# Patient Record
Sex: FEMALE | Race: WHITE
[De-identification: names, ages, dates, MRNs, and addresses within clinical notes are randomized per-mention and may not be internally consistent; named-entity substitution may affect disease eponyms.]

---

## 2018-08-11 ENCOUNTER — HOSPITAL ENCOUNTER (EMERGENCY)
Dept: HOSPITAL 95 - ER | Age: 58
Discharge: HOME | End: 2018-08-11
Payer: COMMERCIAL

## 2018-08-11 VITALS — HEIGHT: 62 IN | WEIGHT: 180.01 LBS | BODY MASS INDEX: 33.13 KG/M2

## 2018-08-11 DIAGNOSIS — M65.311: Primary | ICD-10-CM

## 2018-08-11 DIAGNOSIS — Z88.1: ICD-10-CM

## 2018-08-11 DIAGNOSIS — Z79.899: ICD-10-CM

## 2018-08-11 DIAGNOSIS — F17.210: ICD-10-CM

## 2023-08-16 ENCOUNTER — HOSPITAL ENCOUNTER (OUTPATIENT)
Dept: HOSPITAL 95 - LAB | Age: 63
End: 2023-08-16
Attending: FAMILY MEDICINE
Payer: COMMERCIAL

## 2023-08-16 DIAGNOSIS — E11.69: Primary | ICD-10-CM

## 2023-08-16 LAB
CREAT UR-MCNC: 74 MG/DL (ref 27–270)
MICROALBUMIN UR-MCNC: 5.55 MG/L (ref 0–20)
MICROALBUMIN/CREAT UR: 7.5 MG/G (ref 0–30)

## 2024-09-21 ENCOUNTER — HOSPITAL ENCOUNTER (INPATIENT)
Dept: HOSPITAL 95 - ER | Age: 64
LOS: 3 days | Discharge: HOME | DRG: 178 | End: 2024-09-24
Attending: HOSPITALIST | Admitting: HOSPITALIST
Payer: COMMERCIAL

## 2024-09-21 VITALS — DIASTOLIC BLOOD PRESSURE: 56 MMHG | SYSTOLIC BLOOD PRESSURE: 112 MMHG

## 2024-09-21 VITALS — HEIGHT: 64 IN | WEIGHT: 179.68 LBS | BODY MASS INDEX: 30.67 KG/M2

## 2024-09-21 VITALS — DIASTOLIC BLOOD PRESSURE: 61 MMHG | SYSTOLIC BLOOD PRESSURE: 111 MMHG

## 2024-09-21 VITALS — DIASTOLIC BLOOD PRESSURE: 58 MMHG | SYSTOLIC BLOOD PRESSURE: 124 MMHG

## 2024-09-21 DIAGNOSIS — I10: ICD-10-CM

## 2024-09-21 DIAGNOSIS — E78.5: ICD-10-CM

## 2024-09-21 DIAGNOSIS — J15.69: Primary | ICD-10-CM

## 2024-09-21 DIAGNOSIS — Z79.51: ICD-10-CM

## 2024-09-21 DIAGNOSIS — J44.0: ICD-10-CM

## 2024-09-21 DIAGNOSIS — Z90.710: ICD-10-CM

## 2024-09-21 DIAGNOSIS — Z79.899: ICD-10-CM

## 2024-09-21 DIAGNOSIS — Z98.890: ICD-10-CM

## 2024-09-21 DIAGNOSIS — F17.210: ICD-10-CM

## 2024-09-21 LAB
ALBUMIN SERPL BCP-MCNC: 1.8 G/DL (ref 3.4–5)
ALBUMIN/GLOB SERPL: 0.3 {RATIO} (ref 0.8–1.8)
ALT SERPL W P-5'-P-CCNC: 71 U/L (ref 12–78)
ANION GAP SERPL CALCULATED.4IONS-SCNC: 12 MMOL/L (ref 3–11)
AST SERPL W P-5'-P-CCNC: 99 U/L (ref 12–37)
BASOPHILS # BLD AUTO: 0.06 K/MM3 (ref 0–0.23)
BASOPHILS NFR BLD AUTO: 0 % (ref 0–2)
BILIRUB SERPL-MCNC: 0.4 MG/DL (ref 0.1–1)
BUN SERPL-MCNC: 15 MG/DL (ref 8–24)
CALCIUM SERPL-MCNC: 9.2 MG/DL (ref 8.5–10.1)
CHLORIDE SERPL-SCNC: 102 MMOL/L (ref 98–108)
CO2 SERPL-SCNC: 23 MMOL/L (ref 21–32)
CREAT SERPL-MCNC: 0.62 MG/DL (ref 0.4–1)
DEPRECATED RDW RBC AUTO: 48.9 FL (ref 35.1–46.3)
EOSINOPHIL # BLD AUTO: 0.16 K/MM3 (ref 0–0.68)
EOSINOPHIL NFR BLD AUTO: 1 % (ref 0–6)
ERYTHROCYTE [DISTWIDTH] IN BLOOD BY AUTOMATED COUNT: 15.3 % (ref 11.7–14.2)
GLOBULIN SER CALC-MCNC: 5.3 G/DL (ref 2.2–4)
GLUCOSE SERPL-MCNC: 139 MG/DL (ref 70–99)
HCT VFR BLD AUTO: 32.4 % (ref 33–51)
HGB BLD-MCNC: 11.1 G/DL (ref 11.5–16)
IMM GRANULOCYTES # BLD AUTO: 0.13 K/MM3 (ref 0–0.1)
IMM GRANULOCYTES NFR BLD AUTO: 1 % (ref 0–1)
LYMPHOCYTES # BLD AUTO: 1.81 K/MM3 (ref 0.84–5.2)
LYMPHOCYTES NFR BLD AUTO: 13 % (ref 21–46)
MCHC RBC AUTO-ENTMCNC: 34.3 G/DL (ref 31.5–36.5)
MCV RBC AUTO: 87 FL (ref 80–100)
MONOCYTES # BLD AUTO: 0.62 K/MM3 (ref 0.16–1.47)
MONOCYTES NFR BLD AUTO: 4 % (ref 4–13)
NEUTROPHILS # BLD AUTO: 11.56 K/MM3 (ref 1.96–9.15)
NEUTROPHILS NFR BLD AUTO: 81 % (ref 41–73)
NRBC # BLD AUTO: 0 K/MM3 (ref 0–0.02)
NRBC BLD AUTO-RTO: 0 /100 WBC (ref 0–0.2)
PLATELET # BLD AUTO: 763 K/MM3 (ref 150–400)
POTASSIUM SERPL-SCNC: 4.1 MMOL/L (ref 3.5–5.5)
PROT SERPL-MCNC: 7.1 G/DL (ref 6.4–8.2)
SODIUM SERPL-SCNC: 133 MMOL/L (ref 136–145)

## 2024-09-21 PROCEDURE — A9270 NON-COVERED ITEM OR SERVICE: HCPCS

## 2024-09-21 PROCEDURE — G0378 HOSPITAL OBSERVATION PER HR: HCPCS

## 2024-09-22 VITALS — SYSTOLIC BLOOD PRESSURE: 108 MMHG | DIASTOLIC BLOOD PRESSURE: 69 MMHG

## 2024-09-22 VITALS — DIASTOLIC BLOOD PRESSURE: 80 MMHG | SYSTOLIC BLOOD PRESSURE: 140 MMHG

## 2024-09-22 VITALS — SYSTOLIC BLOOD PRESSURE: 144 MMHG | DIASTOLIC BLOOD PRESSURE: 73 MMHG

## 2024-09-22 VITALS — SYSTOLIC BLOOD PRESSURE: 120 MMHG | DIASTOLIC BLOOD PRESSURE: 68 MMHG

## 2024-09-22 LAB
ALBUMIN SERPL BCP-MCNC: 1.6 G/DL (ref 3.4–5)
ALBUMIN/GLOB SERPL: 0.3 {RATIO} (ref 0.8–1.8)
ALT SERPL W P-5'-P-CCNC: 67 U/L (ref 12–78)
ANION GAP SERPL CALCULATED.4IONS-SCNC: 11 MMOL/L (ref 3–11)
AST SERPL W P-5'-P-CCNC: 75 U/L (ref 12–37)
BASOPHILS # BLD AUTO: 0.08 K/MM3 (ref 0–0.23)
BASOPHILS NFR BLD AUTO: 1 % (ref 0–2)
BILIRUB SERPL-MCNC: 0.3 MG/DL (ref 0.1–1)
BUN SERPL-MCNC: 13 MG/DL (ref 8–24)
CALCIUM SERPL-MCNC: 8.5 MG/DL (ref 8.5–10.1)
CHLORIDE SERPL-SCNC: 108 MMOL/L (ref 98–108)
CO2 SERPL-SCNC: 24 MMOL/L (ref 21–32)
CREAT SERPL-MCNC: 0.77 MG/DL (ref 0.4–1)
DEPRECATED RDW RBC AUTO: 50.2 FL (ref 35.1–46.3)
EOSINOPHIL # BLD AUTO: 0.13 K/MM3 (ref 0–0.68)
EOSINOPHIL NFR BLD AUTO: 1 % (ref 0–6)
ERYTHROCYTE [DISTWIDTH] IN BLOOD BY AUTOMATED COUNT: 15.4 % (ref 11.7–14.2)
GLOBULIN SER CALC-MCNC: 4.7 G/DL (ref 2.2–4)
GLUCOSE SERPL-MCNC: 117 MG/DL (ref 70–99)
HCT VFR BLD AUTO: 30.7 % (ref 33–51)
HGB BLD-MCNC: 10.1 G/DL (ref 11.5–16)
IMM GRANULOCYTES # BLD AUTO: 0.07 K/MM3 (ref 0–0.1)
IMM GRANULOCYTES NFR BLD AUTO: 1 % (ref 0–1)
LYMPHOCYTES # BLD AUTO: 1.89 K/MM3 (ref 0.84–5.2)
LYMPHOCYTES NFR BLD AUTO: 19 % (ref 21–46)
MCHC RBC AUTO-ENTMCNC: 32.9 G/DL (ref 31.5–36.5)
MCV RBC AUTO: 89 FL (ref 80–100)
MONOCYTES # BLD AUTO: 0.54 K/MM3 (ref 0.16–1.47)
MONOCYTES NFR BLD AUTO: 5 % (ref 4–13)
NEUTROPHILS # BLD AUTO: 7.46 K/MM3 (ref 1.96–9.15)
NEUTROPHILS NFR BLD AUTO: 73 % (ref 41–73)
NRBC # BLD AUTO: 0 K/MM3 (ref 0–0.02)
NRBC BLD AUTO-RTO: 0 /100 WBC (ref 0–0.2)
PLATELET # BLD AUTO: 718 K/MM3 (ref 150–400)
POTASSIUM SERPL-SCNC: 4.1 MMOL/L (ref 3.5–5.5)
PROT SERPL-MCNC: 6.3 G/DL (ref 6.4–8.2)
SODIUM SERPL-SCNC: 139 MMOL/L (ref 136–145)

## 2024-09-22 NOTE — NUR
SHIFT SUMMARY NOC
 
PT A/O X 4. PLEASANT AND COOPERATIVE WITH CARE. VSS. BP STILL ON SOFTER SIDE
SO HOME CATAPRES STILL NOT ORDERED UNTIL PT START HAVING CONSITENT SYTOLIC BP
>140. HOME PROGESTERONE ORDERED EVERY OTHER DAY FOR HOT FLASHES.  PT STARTED
ON VANCOMYCIN AND ZOSYN TO TX RUL PNA. PT HAS HARSH BARKING COUGH AND DOSE OF
DELSYM GIVEN. PT ON TELE SINUS RHYTHM IN 80'S-90'S. PT CURRENTLY RESTING WITH
BED IN LOWEST POSITION, AND CALL LIGHT WITHIN REACH.

## 2024-09-22 NOTE — NUR
VSS, A-Ox4, denies SOB, denies any pain, ambulates independently, on RA, on
Airborne precautions for TB rule out. Lung diminished, heart regular, bowel
sounds normative. Pt can make needs known, call bell in hand, bed in lowest
position.

## 2024-09-23 VITALS — DIASTOLIC BLOOD PRESSURE: 77 MMHG | SYSTOLIC BLOOD PRESSURE: 122 MMHG

## 2024-09-23 VITALS — DIASTOLIC BLOOD PRESSURE: 78 MMHG | SYSTOLIC BLOOD PRESSURE: 133 MMHG

## 2024-09-23 VITALS — DIASTOLIC BLOOD PRESSURE: 82 MMHG | SYSTOLIC BLOOD PRESSURE: 128 MMHG

## 2024-09-23 LAB
ALBUMIN SERPL BCP-MCNC: 1.7 G/DL (ref 3.4–5)
ALBUMIN/GLOB SERPL: 0.4 {RATIO} (ref 0.8–1.8)
ALT SERPL W P-5'-P-CCNC: 72 U/L (ref 12–78)
ANION GAP SERPL CALCULATED.4IONS-SCNC: 11 MMOL/L (ref 3–11)
AST SERPL W P-5'-P-CCNC: 79 U/L (ref 12–37)
BASOPHILS # BLD AUTO: 0.09 K/MM3 (ref 0–0.23)
BASOPHILS NFR BLD AUTO: 1 % (ref 0–2)
BILIRUB SERPL-MCNC: 0.2 MG/DL (ref 0.1–1)
BUN SERPL-MCNC: 16 MG/DL (ref 8–24)
CALCIUM SERPL-MCNC: 9 MG/DL (ref 8.5–10.1)
CHLORIDE SERPL-SCNC: 106 MMOL/L (ref 98–108)
CO2 SERPL-SCNC: 25 MMOL/L (ref 21–32)
CREAT SERPL-MCNC: 0.85 MG/DL (ref 0.4–1)
DEPRECATED RDW RBC AUTO: 50.3 FL (ref 35.1–46.3)
EOSINOPHIL # BLD AUTO: 0.2 K/MM3 (ref 0–0.68)
EOSINOPHIL NFR BLD AUTO: 2 % (ref 0–6)
ERYTHROCYTE [DISTWIDTH] IN BLOOD BY AUTOMATED COUNT: 15.5 % (ref 11.7–14.2)
GLOBULIN SER CALC-MCNC: 4.5 G/DL (ref 2.2–4)
GLUCOSE SERPL-MCNC: 147 MG/DL (ref 70–99)
HCT VFR BLD AUTO: 28.9 % (ref 33–51)
HGB BLD-MCNC: 9.5 G/DL (ref 11.5–16)
IMM GRANULOCYTES # BLD AUTO: 0.05 K/MM3 (ref 0–0.1)
IMM GRANULOCYTES NFR BLD AUTO: 1 % (ref 0–1)
LYMPHOCYTES # BLD AUTO: 1.73 K/MM3 (ref 0.84–5.2)
LYMPHOCYTES NFR BLD AUTO: 18 % (ref 21–46)
MCHC RBC AUTO-ENTMCNC: 32.9 G/DL (ref 31.5–36.5)
MCV RBC AUTO: 88 FL (ref 80–100)
MONOCYTES # BLD AUTO: 0.47 K/MM3 (ref 0.16–1.47)
MONOCYTES NFR BLD AUTO: 5 % (ref 4–13)
NEUTROPHILS # BLD AUTO: 7.1 K/MM3 (ref 1.96–9.15)
NEUTROPHILS NFR BLD AUTO: 74 % (ref 41–73)
NRBC # BLD AUTO: 0 K/MM3 (ref 0–0.02)
NRBC BLD AUTO-RTO: 0 /100 WBC (ref 0–0.2)
PLATELET # BLD AUTO: 703 K/MM3 (ref 150–400)
POTASSIUM SERPL-SCNC: 4.4 MMOL/L (ref 3.5–5.5)
PROT SERPL-MCNC: 6.2 G/DL (ref 6.4–8.2)
SODIUM SERPL-SCNC: 138 MMOL/L (ref 136–145)
VANCOMYCIN TROUGH SERPL-MCNC: 17.2 UG/ML (ref 5–10)

## 2024-09-23 NOTE — NUR
SHIFT SUMMARY
PT A&OX4, COOPERATIVE, CALL LIGHT WITHIN REACH. AMBULATE INDEPENDENTLY WITHIN
ROOM. IN NEG PRESSURE ROOM DUE TO TUBERCUCLOSIS RULE OUT, HAVE NOT GOTTEN LAB
RESULTS YET. ISO STARTED 9/22/24. TOLERATING ANTIBIOTICS WELL, AND COOPERATIVE
WITH SPECIMENS COLLECTED. ABLE TO MAKE NEEDS KNOWN. CALL LIGHT WITHIN REACH.

## 2024-09-23 NOTE — NUR
NIGHT SHIFT SUMMARY
 
PT A/OX4. NO ACUTE CHANGES. PT CONTINUES TO C/O OF PERSISTENT COUGHING UNTIL
VOMITING. GAVE PT PRN COUGH SYRUP. PT REPORTS A GENERAL SENSE OF STARTING TO
FEEL BETTER. PT ABLE TO MAKE NEEDS KNOWN. CALL LIGHT IN REACH.

## 2024-09-24 VITALS — SYSTOLIC BLOOD PRESSURE: 135 MMHG | DIASTOLIC BLOOD PRESSURE: 73 MMHG

## 2024-09-24 VITALS — SYSTOLIC BLOOD PRESSURE: 115 MMHG | DIASTOLIC BLOOD PRESSURE: 71 MMHG

## 2024-09-24 VITALS — DIASTOLIC BLOOD PRESSURE: 76 MMHG | SYSTOLIC BLOOD PRESSURE: 112 MMHG

## 2024-09-24 LAB
ALBUMIN SERPL BCP-MCNC: 2 G/DL (ref 3.4–5)
ALBUMIN/GLOB SERPL: 0.4 {RATIO} (ref 0.8–1.8)
ALT SERPL W P-5'-P-CCNC: 76 U/L (ref 12–78)
ANION GAP SERPL CALCULATED.4IONS-SCNC: 9 MMOL/L (ref 3–11)
AST SERPL W P-5'-P-CCNC: 66 U/L (ref 12–37)
BASOPHILS # BLD AUTO: 0.12 K/MM3 (ref 0–0.23)
BASOPHILS NFR BLD AUTO: 2 % (ref 0–2)
BILIRUB SERPL-MCNC: 0.3 MG/DL (ref 0.1–1)
BUN SERPL-MCNC: 12 MG/DL (ref 8–24)
CALCIUM SERPL-MCNC: 9.2 MG/DL (ref 8.5–10.1)
CHLORIDE SERPL-SCNC: 104 MMOL/L (ref 98–108)
CO2 SERPL-SCNC: 26 MMOL/L (ref 21–32)
CREAT SERPL-MCNC: 0.88 MG/DL (ref 0.4–1)
DEPRECATED RDW RBC AUTO: 49.6 FL (ref 35.1–46.3)
EOSINOPHIL # BLD AUTO: 0.25 K/MM3 (ref 0–0.68)
EOSINOPHIL NFR BLD AUTO: 3 % (ref 0–6)
ERYTHROCYTE [DISTWIDTH] IN BLOOD BY AUTOMATED COUNT: 15.4 % (ref 11.7–14.2)
GAMMA INTERFERON BACKGROUND BLD IA-ACNC: 0.01 IU/ML
GLOBULIN SER CALC-MCNC: 4.5 G/DL (ref 2.2–4)
GLUCOSE SERPL-MCNC: 118 MG/DL (ref 70–99)
HCT VFR BLD AUTO: 28.6 % (ref 33–51)
HGB BLD-MCNC: 9.4 G/DL (ref 11.5–16)
IMM GRANULOCYTES # BLD AUTO: 0.03 K/MM3 (ref 0–0.1)
IMM GRANULOCYTES NFR BLD AUTO: 0 % (ref 0–1)
LYMPHOCYTES # BLD AUTO: 1.8 K/MM3 (ref 0.84–5.2)
LYMPHOCYTES NFR BLD AUTO: 23 % (ref 21–46)
M TB IFN-G CD4+ BCKGRND COR BLD-ACNC: 0.03 IU/ML (ref ?–0.34)
M TB IFN-G CD4+CD8+ BCKGRND COR BLD-ACNC: 0.01 IU/ML (ref ?–0.34)
MCHC RBC AUTO-ENTMCNC: 32.9 G/DL (ref 31.5–36.5)
MCV RBC AUTO: 88 FL (ref 80–100)
MITOGEN IGNF BCKGRD COR BLD-ACNC: 0.28 IU/ML
MONOCYTES # BLD AUTO: 0.48 K/MM3 (ref 0.16–1.47)
MONOCYTES NFR BLD AUTO: 6 % (ref 4–13)
NEUTROPHILS # BLD AUTO: 5.3 K/MM3 (ref 1.96–9.15)
NEUTROPHILS NFR BLD AUTO: 66 % (ref 41–73)
NRBC # BLD AUTO: 0 K/MM3 (ref 0–0.02)
NRBC BLD AUTO-RTO: 0 /100 WBC (ref 0–0.2)
PLATELET # BLD AUTO: 704 K/MM3 (ref 150–400)
POTASSIUM SERPL-SCNC: 4.4 MMOL/L (ref 3.5–5.5)
PROT SERPL-MCNC: 6.5 G/DL (ref 6.4–8.2)
SODIUM SERPL-SCNC: 135 MMOL/L (ref 136–145)

## 2024-09-24 NOTE — NUR
NIGHT SHIFT SUMMARY
 
PT A/OX4. ABLE TO MAKE NEEDS KNOWN. NO ACUTE CHANGES.
 
NEW [place]IN[place] POWER GLIDE PLACED. PREVIOUS LEFT FORARM IV SITE WITH REDNESS, WARMTH
AND MILD TENDERNESS. PT DECLINED ICE PACK THIS AM; EDUCATED PT ON S/S TO
REPORT.
ADMINISTERED DOSE OF COUGH SYRUP. IV ABOX GIVEN PER MAR. CALL
LIGHT ACCESSIBLE.

## 2024-09-24 NOTE — NUR
DISCHARGE NOTE-
PT WAS GIVEN VERBAL AND WRITTEN DISCHARGE INSTRUCTIONS AND ACKNOWLEDGED
UNDERSTANDING OF THEM. PT PG DC'D PRIOR TO DISCHARGE PT ESCORTED OUT VIA WC BY
STAFF. NO S&S OF DISTRESS NOTED.

## 2024-09-25 LAB — REF LAB TEST RESULTS: (no result)

## 2025-01-24 ENCOUNTER — HOSPITAL ENCOUNTER (OUTPATIENT)
Dept: HOSPITAL 95 - ORSCMMR | Age: 65
Discharge: HOME | End: 2025-01-24
Attending: SPECIALIST
Payer: COMMERCIAL

## 2025-01-24 VITALS — HEIGHT: 61 IN | BODY MASS INDEX: 34.3 KG/M2 | WEIGHT: 181.66 LBS

## 2025-01-24 VITALS — SYSTOLIC BLOOD PRESSURE: 99 MMHG | DIASTOLIC BLOOD PRESSURE: 60 MMHG

## 2025-01-24 VITALS — DIASTOLIC BLOOD PRESSURE: 52 MMHG | SYSTOLIC BLOOD PRESSURE: 92 MMHG

## 2025-01-24 VITALS — DIASTOLIC BLOOD PRESSURE: 69 MMHG | SYSTOLIC BLOOD PRESSURE: 117 MMHG

## 2025-01-24 DIAGNOSIS — G47.33: ICD-10-CM

## 2025-01-24 DIAGNOSIS — D12.4: ICD-10-CM

## 2025-01-24 DIAGNOSIS — Z12.11: Primary | ICD-10-CM

## 2025-01-24 DIAGNOSIS — Z79.899: ICD-10-CM

## 2025-01-24 DIAGNOSIS — F32.A: ICD-10-CM

## 2025-01-24 DIAGNOSIS — I10: ICD-10-CM

## 2025-01-24 DIAGNOSIS — J44.9: ICD-10-CM

## 2025-01-24 DIAGNOSIS — K21.9: ICD-10-CM

## 2025-01-24 DIAGNOSIS — E66.9: ICD-10-CM

## 2025-01-24 DIAGNOSIS — K59.00: ICD-10-CM

## 2025-01-24 DIAGNOSIS — Z87.891: ICD-10-CM

## 2025-01-24 DIAGNOSIS — R19.5: ICD-10-CM

## 2025-01-24 DIAGNOSIS — E78.5: ICD-10-CM

## 2025-01-24 DIAGNOSIS — Z79.82: ICD-10-CM

## 2025-01-24 PROCEDURE — 0DBM8ZX EXCISION OF DESCENDING COLON, VIA NATURAL OR ARTIFICIAL OPENING ENDOSCOPIC, DIAGNOSTIC: ICD-10-PCS | Performed by: SPECIALIST

## 2025-01-24 NOTE — NUR
01/24/25 0914 Crystal Butler
History, Chart, Medications and Allergies reviewed before start of
procedure.MONITOR INTACT WITH CONTINUOUS PULSE OXIMETRY, CONTINUOUS
END TITAL CO2, AND INTERMITTENT BLOOD PRESSURE.

## 2025-07-03 ENCOUNTER — HOSPITAL ENCOUNTER (OUTPATIENT)
Dept: HOSPITAL 95 - LAB SHORT | Age: 65
Discharge: HOME | End: 2025-07-03
Attending: FAMILY MEDICINE
Payer: COMMERCIAL

## 2025-07-03 DIAGNOSIS — I10: ICD-10-CM

## 2025-07-03 DIAGNOSIS — E11.69: Primary | ICD-10-CM

## 2025-07-03 LAB
ANION GAP SERPL CALCULATED.4IONS-SCNC: 6 MMOL/L (ref 3–11)
BUN SERPL-MCNC: 22 MG/DL (ref 8–24)
BUN/CREAT SERPL: 19.6 % (ref 12–20)
CALCIUM SERPL-MCNC: 9.6 MG/DL (ref 8.5–10.1)
CHLORIDE SERPL-SCNC: 104 MMOL/L (ref 98–108)
CO2 SERPL-SCNC: 27 MMOL/L (ref 21–32)
CREAT SERPL-MCNC: 1.12 MG/DL (ref 0.4–1)
CREAT UR-MCNC: 25.8 MG/DL (ref 27–270)
GFR SERPL CREATININE-BSD FRML MDRD: 55 ML/MIN/{1.73_M2} (ref 60–?)
GLUCOSE SERPL-MCNC: 98 MG/DL (ref 70–99)
MICROALBUMIN UR-MCNC: 6.7 MG/L (ref 0–20)
MICROALBUMIN/CREAT UR: 25.97 MG/G (ref 0–30)
POTASSIUM SERPL-SCNC: 4.2 MMOL/L (ref 3.5–5.5)
SODIUM SERPL-SCNC: 133 MMOL/L (ref 136–145)